# Patient Record
Sex: MALE | Race: WHITE | NOT HISPANIC OR LATINO | URBAN - METROPOLITAN AREA
[De-identification: names, ages, dates, MRNs, and addresses within clinical notes are randomized per-mention and may not be internally consistent; named-entity substitution may affect disease eponyms.]

---

## 2023-07-10 ENCOUNTER — NEW PATIENT (OUTPATIENT)
Dept: URBAN - METROPOLITAN AREA CLINIC 37 | Facility: CLINIC | Age: 78
End: 2023-07-10

## 2023-07-10 DIAGNOSIS — H52.4: ICD-10-CM

## 2023-07-10 DIAGNOSIS — H25.13: ICD-10-CM

## 2023-07-10 DIAGNOSIS — H31.011: ICD-10-CM

## 2023-07-10 PROCEDURE — 92015 DETERMINE REFRACTIVE STATE: CPT

## 2023-07-10 PROCEDURE — 92250 FUNDUS PHOTOGRAPHY W/I&R: CPT

## 2023-07-10 PROCEDURE — 99204 OFFICE O/P NEW MOD 45 MIN: CPT

## 2023-07-10 ASSESSMENT — TONOMETRY
OS_IOP_MMHG: 15
OD_IOP_MMHG: 16

## 2023-07-10 ASSESSMENT — VISUAL ACUITY
OU_CC: 20/40-1
OS_CC: 20/30
OD_CC: CF 4FT
OU_CC: 20/30-1

## 2023-07-10 ASSESSMENT — KERATOMETRY
OD_AXISANGLE2_DEGREES: 7
OS_AXISANGLE2_DEGREES: 156
OS_K2POWER_DIOPTERS: 44.00
OD_K1POWER_DIOPTERS: 42.00
OS_AXISANGLE_DEGREES: 66
OD_AXISANGLE_DEGREES: 97
OD_K2POWER_DIOPTERS: 44.25
OS_K1POWER_DIOPTERS: 42.00

## 2023-07-14 ENCOUNTER — CATARACT CONSULTATION (OUTPATIENT)
Dept: URBAN - METROPOLITAN AREA CLINIC 37 | Facility: CLINIC | Age: 78
End: 2023-07-14

## 2023-07-14 DIAGNOSIS — H25.13: ICD-10-CM

## 2023-07-14 PROCEDURE — 99214 OFFICE O/P EST MOD 30 MIN: CPT

## 2023-07-14 ASSESSMENT — KERATOMETRY
OS_K1POWER_DIOPTERS: 41.50
OS_AXISANGLE_DEGREES: 66
OD_AXISANGLE2_DEGREES: 7
OS_AXISANGLE2_DEGREES: 164
OS_AXISANGLE2_DEGREES: 156
OS_K1POWER_DIOPTERS: 42.00
OS_AXISANGLE_DEGREES: 74
OD_K2POWER_DIOPTERS: 44.25
OD_AXISANGLE_DEGREES: 97
OS_K2POWER_DIOPTERS: 44.00
OD_AXISANGLE2_DEGREES: 005
OD_K1POWER_DIOPTERS: 42.25
OD_AXISANGLE_DEGREES: 95
OS_K2POWER_DIOPTERS: 43.75
OD_K1POWER_DIOPTERS: 42.00

## 2023-07-14 ASSESSMENT — VISUAL ACUITY
OD_CC: 20/100+1
OS_CC: 20/25-2
OS_CC: 20/25+1
OD_CC: 20/200

## 2023-07-14 ASSESSMENT — TONOMETRY
OS_IOP_MMHG: 17.0
OD_IOP_MMHG: 15.0

## 2023-07-21 ENCOUNTER — DIAGNOSTICS ONLY (OUTPATIENT)
Dept: URBAN - METROPOLITAN AREA CLINIC 37 | Facility: CLINIC | Age: 78
End: 2023-07-21

## 2023-07-21 DIAGNOSIS — H25.13: ICD-10-CM

## 2023-07-21 PROCEDURE — 92136 OPHTHALMIC BIOMETRY: CPT

## 2023-07-21 ASSESSMENT — KERATOMETRY
OS_AXISANGLE_DEGREES: 66
OD_AXISANGLE_DEGREES: 95
OS_AXISANGLE2_DEGREES: 156
OS_K1POWER_DIOPTERS: 41.50
OS_K2POWER_DIOPTERS: 44.00
OD_K1POWER_DIOPTERS: 42.25
OD_K2POWER_DIOPTERS: 44.25
OS_AXISANGLE_DEGREES: 74
OS_AXISANGLE2_DEGREES: 164
OS_K1POWER_DIOPTERS: 42.00
OD_AXISANGLE_DEGREES: 97
OS_K2POWER_DIOPTERS: 43.75
OD_K1POWER_DIOPTERS: 42.00
OD_AXISANGLE2_DEGREES: 7
OD_AXISANGLE2_DEGREES: 005

## 2023-09-21 ENCOUNTER — SURGERY/PROCEDURE (OUTPATIENT)
Dept: SURGICAL CENTER 4 | Facility: SURGICAL CENTER | Age: 78
End: 2023-09-21

## 2023-09-21 DIAGNOSIS — H25.11: ICD-10-CM

## 2023-09-21 PROCEDURE — 66984 XCAPSL CTRC RMVL W/O ECP: CPT

## 2023-09-22 ENCOUNTER — 1 DAY POST-OP (OUTPATIENT)
Dept: URBAN - METROPOLITAN AREA CLINIC 37 | Facility: CLINIC | Age: 78
End: 2023-09-22

## 2023-09-22 DIAGNOSIS — Z96.1: ICD-10-CM

## 2023-09-22 PROCEDURE — 99024 POSTOP FOLLOW-UP VISIT: CPT

## 2023-09-22 ASSESSMENT — KERATOMETRY
OS_AXISANGLE_DEGREES: 66
OD_AXISANGLE_DEGREES: 97
OS_K1POWER_DIOPTERS: 42.00
OD_K1POWER_DIOPTERS: 42.00
OS_AXISANGLE2_DEGREES: 156
OS_K1POWER_DIOPTERS: 41.50
OD_K2POWER_DIOPTERS: 44.25
OS_K2POWER_DIOPTERS: 43.75
OD_AXISANGLE2_DEGREES: 7
OS_AXISANGLE2_DEGREES: 164
OD_AXISANGLE2_DEGREES: 005
OD_K1POWER_DIOPTERS: 42.25
OD_AXISANGLE_DEGREES: 95
OS_K2POWER_DIOPTERS: 44.00
OS_AXISANGLE_DEGREES: 74

## 2023-09-22 ASSESSMENT — VISUAL ACUITY: OD_SC: CF 4FT

## 2023-09-26 ASSESSMENT — KERATOMETRY
OD_K1POWER_DIOPTERS: 42.25
OS_K2POWER_DIOPTERS: 44.00
OS_K1POWER_DIOPTERS: 41.50
OS_K2POWER_DIOPTERS: 43.75
OS_K1POWER_DIOPTERS: 42.00
OS_AXISANGLE2_DEGREES: 156
OD_K2POWER_DIOPTERS: 44.25
OD_AXISANGLE_DEGREES: 95
OD_K1POWER_DIOPTERS: 42.00
OS_AXISANGLE2_DEGREES: 164
OS_AXISANGLE_DEGREES: 74
OD_AXISANGLE2_DEGREES: 005
OD_AXISANGLE2_DEGREES: 7
OD_AXISANGLE_DEGREES: 97
OS_AXISANGLE_DEGREES: 66

## 2023-10-05 ENCOUNTER — SURGERY/PROCEDURE (OUTPATIENT)
Dept: SURGICAL CENTER 4 | Facility: SURGICAL CENTER | Age: 78
End: 2023-10-05

## 2023-10-05 ENCOUNTER — DIAGNOSTICS ONLY (OUTPATIENT)
Dept: URBAN - METROPOLITAN AREA CLINIC 37 | Facility: CLINIC | Age: 78
End: 2023-10-05

## 2023-10-05 DIAGNOSIS — H25.12: ICD-10-CM

## 2023-10-05 PROCEDURE — 92136 OPHTHALMIC BIOMETRY: CPT | Mod: 26,LT

## 2023-10-05 PROCEDURE — 66984 XCAPSL CTRC RMVL W/O ECP: CPT | Mod: 79,LT

## 2023-10-06 ENCOUNTER — 1 DAY POST-OP (OUTPATIENT)
Dept: URBAN - METROPOLITAN AREA CLINIC 37 | Facility: CLINIC | Age: 78
End: 2023-10-06

## 2023-10-06 DIAGNOSIS — Z96.1: ICD-10-CM

## 2023-10-06 PROCEDURE — 99024 POSTOP FOLLOW-UP VISIT: CPT

## 2023-10-06 ASSESSMENT — VISUAL ACUITY
OD_SC: 20/80-1
OS_SC: 20/60+1
OS_PH: 20/50

## 2023-10-06 ASSESSMENT — KERATOMETRY
OS_K2POWER_DIOPTERS: 43.75
OS_AXISANGLE2_DEGREES: 164
OS_AXISANGLE2_DEGREES: 156
OD_K1POWER_DIOPTERS: 42.00
OD_AXISANGLE_DEGREES: 97
OD_AXISANGLE2_DEGREES: 005
OS_K1POWER_DIOPTERS: 41.50
OD_AXISANGLE2_DEGREES: 7
OD_K2POWER_DIOPTERS: 44.25
OD_AXISANGLE_DEGREES: 95
OS_K1POWER_DIOPTERS: 42.00
OS_K2POWER_DIOPTERS: 44.00
OS_AXISANGLE_DEGREES: 66
OS_AXISANGLE_DEGREES: 74
OD_K1POWER_DIOPTERS: 42.25

## 2023-10-06 ASSESSMENT — TONOMETRY
OD_IOP_MMHG: 17
OS_IOP_MMHG: 21
OD_IOP_MMHG: 12
OS_IOP_MMHG: 27

## 2023-10-12 ASSESSMENT — KERATOMETRY
OD_AXISANGLE_DEGREES: 97
OS_K2POWER_DIOPTERS: 44.00
OD_K1POWER_DIOPTERS: 42.25
OS_K1POWER_DIOPTERS: 42.00
OS_AXISANGLE2_DEGREES: 156
OD_K2POWER_DIOPTERS: 44.25
OD_AXISANGLE_DEGREES: 97
OS_K2POWER_DIOPTERS: 44.00
OS_K2POWER_DIOPTERS: 43.75
OS_AXISANGLE2_DEGREES: 156
OD_AXISANGLE2_DEGREES: 7
OD_AXISANGLE2_DEGREES: 7
OS_AXISANGLE2_DEGREES: 164
OS_K1POWER_DIOPTERS: 41.50
OS_K1POWER_DIOPTERS: 41.50
OS_K1POWER_DIOPTERS: 42.00
OS_AXISANGLE_DEGREES: 66
OD_AXISANGLE2_DEGREES: 005
OD_AXISANGLE_DEGREES: 95
OD_AXISANGLE_DEGREES: 95
OD_K1POWER_DIOPTERS: 42.25
OS_AXISANGLE_DEGREES: 74
OD_K1POWER_DIOPTERS: 42.00
OS_K2POWER_DIOPTERS: 43.75
OD_AXISANGLE2_DEGREES: 005
OS_AXISANGLE_DEGREES: 74
OD_K1POWER_DIOPTERS: 42.00
OS_AXISANGLE2_DEGREES: 164
OS_AXISANGLE_DEGREES: 66
OD_K2POWER_DIOPTERS: 44.25

## 2023-11-03 ENCOUNTER — 1 MONTH POST-OP (OUTPATIENT)
Dept: URBAN - METROPOLITAN AREA CLINIC 37 | Facility: CLINIC | Age: 78
End: 2023-11-03

## 2023-11-03 DIAGNOSIS — H35.342: ICD-10-CM

## 2023-11-03 DIAGNOSIS — H52.4: ICD-10-CM

## 2023-11-03 DIAGNOSIS — H33.21: ICD-10-CM

## 2023-11-03 DIAGNOSIS — Z96.1: ICD-10-CM

## 2023-11-03 PROCEDURE — 92015 DETERMINE REFRACTIVE STATE: CPT

## 2023-11-03 PROCEDURE — 99024 POSTOP FOLLOW-UP VISIT: CPT

## 2023-11-03 ASSESSMENT — KERATOMETRY
OS_AXISANGLE2_DEGREES: 164
OD_K1POWER_DIOPTERS: 42.25
OS_K1POWER_DIOPTERS: 41.50
OS_AXISANGLE_DEGREES: 66
OS_K2POWER_DIOPTERS: 44.00
OD_AXISANGLE_DEGREES: 97
OD_AXISANGLE_DEGREES: 95
OD_K1POWER_DIOPTERS: 42.00
OS_K1POWER_DIOPTERS: 42.00
OS_K2POWER_DIOPTERS: 43.75
OD_AXISANGLE2_DEGREES: 005
OS_AXISANGLE2_DEGREES: 156
OS_AXISANGLE_DEGREES: 74
OD_K2POWER_DIOPTERS: 44.25
OD_AXISANGLE2_DEGREES: 7

## 2023-11-03 ASSESSMENT — VISUAL ACUITY
OD_SC: 20/200
OS_SC: 20/80

## 2023-11-03 ASSESSMENT — TONOMETRY
OD_IOP_MMHG: 21
OS_IOP_MMHG: 18

## 2023-11-20 ENCOUNTER — CONSULTATION (OUTPATIENT)
Dept: URBAN - METROPOLITAN AREA CLINIC 37 | Facility: CLINIC | Age: 78
End: 2023-11-20

## 2023-11-20 DIAGNOSIS — H33.301: ICD-10-CM

## 2023-11-20 DIAGNOSIS — H35.342: ICD-10-CM

## 2023-11-20 DIAGNOSIS — H31.321: ICD-10-CM

## 2023-11-20 DIAGNOSIS — H35.051: ICD-10-CM

## 2023-11-20 DIAGNOSIS — H35.372: ICD-10-CM

## 2023-11-20 DIAGNOSIS — H35.352: ICD-10-CM

## 2023-11-20 PROCEDURE — 92014 COMPRE OPH EXAM EST PT 1/>: CPT | Mod: 24

## 2023-11-20 PROCEDURE — 92134 CPTRZ OPH DX IMG PST SGM RTA: CPT

## 2023-11-20 ASSESSMENT — TONOMETRY
OD_IOP_MMHG: 20
OS_IOP_MMHG: 18

## 2023-11-20 ASSESSMENT — VISUAL ACUITY
OD_CC: 20/200-1
OS_CC: 20/100

## 2023-12-06 ENCOUNTER — FOLLOW UP (OUTPATIENT)
Dept: URBAN - METROPOLITAN AREA CLINIC 37 | Facility: CLINIC | Age: 78
End: 2023-12-06

## 2023-12-06 DIAGNOSIS — H33.301: ICD-10-CM

## 2023-12-06 DIAGNOSIS — H35.372: ICD-10-CM

## 2023-12-06 DIAGNOSIS — H35.051: ICD-10-CM

## 2023-12-06 DIAGNOSIS — H31.321: ICD-10-CM

## 2023-12-06 DIAGNOSIS — H35.352: ICD-10-CM

## 2023-12-06 DIAGNOSIS — H35.342: ICD-10-CM

## 2023-12-06 PROCEDURE — 99213 OFFICE O/P EST LOW 20 MIN: CPT | Mod: 24

## 2023-12-06 PROCEDURE — 92134 CPTRZ OPH DX IMG PST SGM RTA: CPT

## 2023-12-06 ASSESSMENT — TONOMETRY
OS_IOP_MMHG: 8
OD_IOP_MMHG: 13

## 2023-12-06 ASSESSMENT — VISUAL ACUITY
OD_CC: 20/40
OS_CC: 20/30

## 2024-01-05 ENCOUNTER — FOLLOW UP (OUTPATIENT)
Dept: URBAN - METROPOLITAN AREA CLINIC 37 | Facility: CLINIC | Age: 79
End: 2024-01-05

## 2024-01-05 DIAGNOSIS — Z96.1: ICD-10-CM

## 2024-01-05 DIAGNOSIS — H26.493: ICD-10-CM

## 2024-01-05 PROCEDURE — 92014 COMPRE OPH EXAM EST PT 1/>: CPT

## 2024-01-05 ASSESSMENT — TONOMETRY
OS_IOP_MMHG: 13
OD_IOP_MMHG: 18

## 2024-01-05 ASSESSMENT — VISUAL ACUITY
OS_CC: 20/30-1
OD_CC: 20/100-2

## 2024-01-24 ENCOUNTER — ESTABLISHED COMPREHENSIVE EXAM (OUTPATIENT)
Dept: URBAN - METROPOLITAN AREA CLINIC 37 | Facility: CLINIC | Age: 79
End: 2024-01-24

## 2024-01-24 DIAGNOSIS — H35.342: ICD-10-CM

## 2024-01-24 DIAGNOSIS — H35.352: ICD-10-CM

## 2024-01-24 DIAGNOSIS — H31.321: ICD-10-CM

## 2024-01-24 DIAGNOSIS — H35.051: ICD-10-CM

## 2024-01-24 DIAGNOSIS — H33.301: ICD-10-CM

## 2024-01-24 DIAGNOSIS — Q14.1: ICD-10-CM

## 2024-01-24 DIAGNOSIS — H35.372: ICD-10-CM

## 2024-01-24 PROCEDURE — 92134 CPTRZ OPH DX IMG PST SGM RTA: CPT

## 2024-01-24 PROCEDURE — 92012 INTRM OPH EXAM EST PATIENT: CPT

## 2024-01-24 ASSESSMENT — TONOMETRY
OD_IOP_MMHG: 17.0
OS_IOP_MMHG: 17.0

## 2024-01-24 ASSESSMENT — VISUAL ACUITY
OD_CC: 20/50+2
OS_CC: 20/25+2

## 2024-04-16 ENCOUNTER — APPOINTMENT (OUTPATIENT)
Dept: URBAN - METROPOLITAN AREA CLINIC 193 | Age: 79
Setting detail: DERMATOLOGY
End: 2024-04-20

## 2024-04-16 DIAGNOSIS — B07.8 OTHER VIRAL WARTS: ICD-10-CM

## 2024-04-16 DIAGNOSIS — L57.8 OTHER SKIN CHANGES DUE TO CHRONIC EXPOSURE TO NONIONIZING RADIATION: ICD-10-CM

## 2024-04-16 DIAGNOSIS — L82.1 OTHER SEBORRHEIC KERATOSIS: ICD-10-CM

## 2024-04-16 DIAGNOSIS — L57.0 ACTINIC KERATOSIS: ICD-10-CM

## 2024-04-16 DIAGNOSIS — L82.0 INFLAMED SEBORRHEIC KERATOSIS: ICD-10-CM

## 2024-04-16 DIAGNOSIS — Z71.89 OTHER SPECIFIED COUNSELING: ICD-10-CM

## 2024-04-16 DIAGNOSIS — L81.4 OTHER MELANIN HYPERPIGMENTATION: ICD-10-CM

## 2024-04-16 DIAGNOSIS — D22 MELANOCYTIC NEVI: ICD-10-CM

## 2024-04-16 PROBLEM — D22.61 MELANOCYTIC NEVI OF RIGHT UPPER LIMB, INCLUDING SHOULDER: Status: ACTIVE | Noted: 2024-04-16

## 2024-04-16 PROCEDURE — 17003 DESTRUCT PREMALG LES 2-14: CPT | Mod: 59

## 2024-04-16 PROCEDURE — 17111 DESTRUCT LESION 15 OR MORE: CPT

## 2024-04-16 PROCEDURE — 99203 OFFICE O/P NEW LOW 30 MIN: CPT | Mod: 25

## 2024-04-16 PROCEDURE — OTHER COUNSELING: OTHER

## 2024-04-16 PROCEDURE — OTHER MIPS QUALITY: OTHER

## 2024-04-16 PROCEDURE — OTHER PRESCRIPTION MEDICATION MANAGEMENT: OTHER

## 2024-04-16 PROCEDURE — 17000 DESTRUCT PREMALG LESION: CPT | Mod: 59

## 2024-04-16 PROCEDURE — OTHER LIQUID NITROGEN: OTHER

## 2024-04-16 ASSESSMENT — LOCATION DETAILED DESCRIPTION DERM
LOCATION DETAILED: LEFT SUPERIOR LATERAL MALAR CHEEK
LOCATION DETAILED: LEFT MEDIAL SUPERIOR CHEST
LOCATION DETAILED: RIGHT INFERIOR UPPER BACK
LOCATION DETAILED: RIGHT CENTRAL FRONTAL SCALP
LOCATION DETAILED: LEFT PROXIMAL PRETIBIAL REGION
LOCATION DETAILED: RIGHT ANTERIOR PROXIMAL UPPER ARM
LOCATION DETAILED: LEFT ELBOW
LOCATION DETAILED: RIGHT PROXIMAL DORSAL FOREARM
LOCATION DETAILED: RIGHT FOREHEAD
LOCATION DETAILED: RIGHT CYMBA CONCHA
LOCATION DETAILED: RIGHT LATERAL FOREHEAD
LOCATION DETAILED: LEFT SUPERIOR LATERAL NECK
LOCATION DETAILED: LEFT MID TEMPLE
LOCATION DETAILED: LEFT POSTERIOR SHOULDER
LOCATION DETAILED: LEFT SUPERIOR LATERAL FOREHEAD
LOCATION DETAILED: LEFT CENTRAL LATERAL NECK
LOCATION DETAILED: LEFT CENTRAL POSTERIOR NECK
LOCATION DETAILED: LEFT CENTRAL FRONTAL SCALP
LOCATION DETAILED: RIGHT MEDIAL UPPER BACK
LOCATION DETAILED: LEFT INFERIOR POSTAURICULAR SKIN
LOCATION DETAILED: LEFT INFERIOR ANTERIOR NECK
LOCATION DETAILED: LEFT INFERIOR TEMPLE
LOCATION DETAILED: LEFT LATERAL FRONTAL SCALP
LOCATION DETAILED: LEFT MEDIAL INFERIOR CHEST
LOCATION DETAILED: LEFT SUPERIOR TEMPLE
LOCATION DETAILED: LEFT PROXIMAL ULNAR THUMB
LOCATION DETAILED: LEFT SUPERIOR PREAURICULAR CHEEK
LOCATION DETAILED: LEFT ULNAR DORSAL HAND
LOCATION DETAILED: RIGHT DISTAL DORSAL FOREARM
LOCATION DETAILED: LEFT SUPERIOR LATERAL UPPER BACK
LOCATION DETAILED: RIGHT SUPERIOR FOREHEAD
LOCATION DETAILED: LEFT SUPERIOR FOREHEAD
LOCATION DETAILED: RIGHT SUPERIOR LATERAL FOREHEAD
LOCATION DETAILED: LEFT LATERAL FOREHEAD
LOCATION DETAILED: LEFT MID-UPPER BACK
LOCATION DETAILED: LEFT CENTRAL POSTAURICULAR SKIN

## 2024-04-16 ASSESSMENT — LOCATION ZONE DERM
LOCATION ZONE: LEG
LOCATION ZONE: FACE
LOCATION ZONE: SCALP
LOCATION ZONE: EAR
LOCATION ZONE: NECK
LOCATION ZONE: FINGER
LOCATION ZONE: HAND
LOCATION ZONE: TRUNK
LOCATION ZONE: ARM

## 2024-04-16 ASSESSMENT — LOCATION SIMPLE DESCRIPTION DERM
LOCATION SIMPLE: LEFT HAND
LOCATION SIMPLE: LEFT ELBOW
LOCATION SIMPLE: RIGHT FOREARM
LOCATION SIMPLE: LEFT TEMPLE
LOCATION SIMPLE: CHEST
LOCATION SIMPLE: RIGHT UPPER ARM
LOCATION SIMPLE: NECK
LOCATION SIMPLE: RIGHT SCALP
LOCATION SIMPLE: LEFT THUMB
LOCATION SIMPLE: LEFT SCALP
LOCATION SIMPLE: SCALP
LOCATION SIMPLE: RIGHT EAR
LOCATION SIMPLE: RIGHT FOREHEAD
LOCATION SIMPLE: LEFT UPPER BACK
LOCATION SIMPLE: LEFT ANTERIOR NECK
LOCATION SIMPLE: RIGHT UPPER BACK
LOCATION SIMPLE: LEFT SHOULDER
LOCATION SIMPLE: LEFT FOREHEAD
LOCATION SIMPLE: LEFT PRETIBIAL REGION
LOCATION SIMPLE: LEFT CHEEK

## 2024-04-16 NOTE — PROCEDURE: LIQUID NITROGEN
Medical Necessity Clause: This procedure was medically necessary because the lesions that were treated were: increasing in size and irritated.
Number Of Freeze-Thaw Cycles: 3 freeze-thaw cycles
Spray Paint Text: The liquid nitrogen was applied to the skin utilizing a spray paint frosting technique.
Render Note In Bullet Format When Appropriate: No
Medical Necessity Information: It is in your best interest to select a reason for this procedure from the list below. All of these items fulfill various CMS LCD requirements except the new and changing color options.
Duration Of Freeze Thaw-Cycle (Seconds): 2
Post-Care Instructions: I reviewed with the patient in detail post-care instructions. Patient is to wear sunprotection, and avoid picking at any of the treated lesions. Pt may apply Vaseline to crusted or scabbing areas.
Render Post-Care Instructions In Note?: yes
Consent: The patient's consent was obtained including but not limited to risks of crusting, scabbing, blistering, scarring, darker or lighter pigmentary change, recurrence, incomplete removal and infection.
Application Tool (Optional): Liquid Nitrogen Sprayer
Detail Level: Detailed
Number Of Freeze-Thaw Cycles: 2 freeze-thaw cycles

## 2024-04-16 NOTE — PROCEDURE: COUNSELING
Detail Level: Generalized
Detail Level: Detailed
Patient Specific Counseling (Will Not Stick From Patient To Patient): Pt has a moderate amount of itchy and irritated SK's, I advised the pt to come in every 2 months and I will continue to tx these growths. I also advised him to purchase a jar Urea 40% with Salicylic Acid and apply bid, I told him to purchase it from [employer].

## 2024-04-16 NOTE — PROCEDURE: PRESCRIPTION MEDICATION MANAGEMENT
Samples Given: Aquanil HC anti itch moisturizer
Detail Level: Detailed
Plan: Recommend Urea 40% with SA OTC
Render In Strict Bullet Format?: No

## 2024-04-29 ENCOUNTER — ESTABLISHED COMPREHENSIVE EXAM (OUTPATIENT)
Dept: URBAN - METROPOLITAN AREA CLINIC 37 | Facility: CLINIC | Age: 79
End: 2024-04-29

## 2024-04-29 DIAGNOSIS — H35.372: ICD-10-CM

## 2024-04-29 DIAGNOSIS — H35.051: ICD-10-CM

## 2024-04-29 DIAGNOSIS — H35.342: ICD-10-CM

## 2024-04-29 DIAGNOSIS — H31.321: ICD-10-CM

## 2024-04-29 PROCEDURE — 92134 CPTRZ OPH DX IMG PST SGM RTA: CPT

## 2024-04-29 PROCEDURE — 92202 OPSCPY EXTND ON/MAC DRAW: CPT

## 2024-04-29 PROCEDURE — 92014 COMPRE OPH EXAM EST PT 1/>: CPT

## 2024-04-29 ASSESSMENT — TONOMETRY
OS_IOP_MMHG: 19
OD_IOP_MMHG: 16

## 2024-04-29 ASSESSMENT — VISUAL ACUITY
OD_SC: 20/80+1
OS_SC: 20/30-2

## (undated) RX ORDER — PREDNISOLONE ACETATE 10 MG/ML: 1 SUSPENSION/ DROPS OPHTHALMIC

## (undated) RX ORDER — KETOROLAC TROMETHAMINE 5 MG/ML: 1 SOLUTION OPHTHALMIC